# Patient Record
Sex: FEMALE | HISPANIC OR LATINO | URBAN - NONMETROPOLITAN AREA
[De-identification: names, ages, dates, MRNs, and addresses within clinical notes are randomized per-mention and may not be internally consistent; named-entity substitution may affect disease eponyms.]

---

## 2019-10-18 ENCOUNTER — OFFICE VISIT (OUTPATIENT)
Dept: URBAN - NONMETROPOLITAN AREA CLINIC 18 | Facility: CLINIC | Age: 34
End: 2019-10-18
Payer: MEDICARE

## 2019-10-18 DIAGNOSIS — Z79.899 OTHER LONG TERM (CURRENT) DRUG THERAPY: ICD-10-CM

## 2019-10-18 DIAGNOSIS — M32.9 SYSTEMIC LUPUS ERYTHEMATOSUS, UNSPECIFIED: Primary | ICD-10-CM

## 2019-10-18 PROCEDURE — 92134 CPTRZ OPH DX IMG PST SGM RTA: CPT | Performed by: OPTOMETRIST

## 2019-10-18 PROCEDURE — 99214 OFFICE O/P EST MOD 30 MIN: CPT | Performed by: OPTOMETRIST

## 2019-10-18 ASSESSMENT — VISUAL ACUITY
OS: 20/20
OD: 20/20

## 2019-10-18 ASSESSMENT — INTRAOCULAR PRESSURE
OS: 16
OD: 16

## 2019-10-18 NOTE — IMPRESSION/PLAN
Impression: Systemic lupus erythematosus, unspecified: M32.9. Plan: Discussed status with patient in detail. Baseline OCT macula OU today, no outer retinal thinning. Patient to continue with medications as directed by Rheumatologist /PCP. No signs of macular toxicity observed today. Will continue to monitor retinal anatomy and functional vision. Glasses Rx to help relieve additional vision symptoms, refraction at patient discretion. RTC 6 months for VF 10-2, macula OCT and follow up.

## 2024-11-05 ENCOUNTER — OFFICE VISIT (OUTPATIENT)
Dept: URBAN - NONMETROPOLITAN AREA CLINIC 18 | Facility: CLINIC | Age: 39
End: 2024-11-05
Payer: MEDICARE

## 2024-11-05 DIAGNOSIS — Z79.899 OTHER LONG TERM (CURRENT) DRUG THERAPY: ICD-10-CM

## 2024-11-05 DIAGNOSIS — M32.9 SYSTEMIC LUPUS ERYTHEMATOSUS, UNSPECIFIED: Primary | ICD-10-CM

## 2024-11-05 DIAGNOSIS — H10.413 CONJUNCTIVITIS - ALLERGIC: ICD-10-CM

## 2024-11-05 PROCEDURE — 92004 COMPRE OPH EXAM NEW PT 1/>: CPT | Performed by: OPTOMETRIST

## 2024-11-05 PROCEDURE — 92134 CPTRZ OPH DX IMG PST SGM RTA: CPT | Performed by: OPTOMETRIST

## 2024-11-05 ASSESSMENT — INTRAOCULAR PRESSURE
OD: 13
OS: 13

## 2024-11-05 ASSESSMENT — VISUAL ACUITY
OS: 20/20
OD: 20/20